# Patient Record
Sex: MALE | Race: OTHER | NOT HISPANIC OR LATINO | Employment: OTHER
[De-identification: names, ages, dates, MRNs, and addresses within clinical notes are randomized per-mention and may not be internally consistent; named-entity substitution may affect disease eponyms.]

---

## 2018-12-05 ENCOUNTER — NEW PATIENT COMPREHENSIVE (OUTPATIENT)
Age: 58
End: 2018-12-05

## 2018-12-05 DIAGNOSIS — H35.373: ICD-10-CM

## 2018-12-05 DIAGNOSIS — H25.9: ICD-10-CM

## 2018-12-05 PROCEDURE — 92134 CPTRZ OPH DX IMG PST SGM RTA: CPT

## 2018-12-05 PROCEDURE — 99204 OFFICE O/P NEW MOD 45 MIN: CPT

## 2018-12-05 PROCEDURE — 92015 DETERMINE REFRACTIVE STATE: CPT

## 2018-12-05 ASSESSMENT — VISUAL ACUITY
OD_CC: J5
OS_SC: J8
OS_SC: 20/70
OS_CC: J6
OD_SC: 20/50+1
OD_SC: J8

## 2018-12-05 ASSESSMENT — KERATOMETRY
OD_AXISANGLE_DEGREES: 21
OD_AXISANGLE2_DEGREES: 111
OS_AXISANGLE2_DEGREES: 95
OD_K1POWER_DIOPTERS: 44
OS_AXISANGLE_DEGREES: 5
OS_K2POWER_DIOPTERS: 43.75
OD_K2POWER_DIOPTERS: 43.75
OS_K1POWER_DIOPTERS: 44

## 2018-12-05 ASSESSMENT — TONOMETRY
OS_IOP_MMHG: 11
OD_IOP_MMHG: 12

## 2020-06-29 ENCOUNTER — NEW PATIENT COMPREHENSIVE (OUTPATIENT)
Dept: URBAN - METROPOLITAN AREA CLINIC 35 | Facility: CLINIC | Age: 60
End: 2020-06-29

## 2020-06-29 DIAGNOSIS — H35.362: ICD-10-CM

## 2020-06-29 DIAGNOSIS — H25.813: ICD-10-CM

## 2020-06-29 PROCEDURE — 92004 COMPRE OPH EXAM NEW PT 1/>: CPT

## 2020-06-29 ASSESSMENT — VISUAL ACUITY
OS_CC: 20/40
OS_SC: 20/60+2
OU_CC: 20/40
OU_SC: 20/40
OU_SC: 20/60
OD_SC: 20/50
OD_CC: 20/40-1
OS_SC: 20/60
OD_CC: 20/50
OS_CC: 20/60
OU_CC: 20/30-2
OD_SC: 20/60

## 2020-06-29 ASSESSMENT — KERATOMETRY
OD_AXISANGLE_DEGREES: 21
OS_K2POWER_DIOPTERS: 43.75
OD_K1POWER_DIOPTERS: 44
OS_AXISANGLE2_DEGREES: 95
OS_AXISANGLE_DEGREES: 5
OD_AXISANGLE2_DEGREES: 111
OS_K1POWER_DIOPTERS: 44
OD_K2POWER_DIOPTERS: 43.75

## 2020-06-29 ASSESSMENT — TONOMETRY
OD_IOP_MMHG: 16
OS_IOP_MMHG: 16

## 2021-01-28 NOTE — PATIENT DISCUSSION
Patient understands condition, prognosis and need for follow up care. Subjective:       Patient ID: Narendra Delarosa is a 52 y.o. male.    Chief Complaint: Rash (genital area / present about 2 months / itch )    Got a massage to months ago, then developed scrotal itching. No rash or lesions. Getting better.      Review of Systems   Constitutional: Negative.    Respiratory: Negative.    Cardiovascular: Negative.    Skin: Positive for rash.       Objective:      Physical Exam   Constitutional: He appears well-developed and well-nourished.   HENT:   Head: Normocephalic and atraumatic.   Eyes: Pupils are equal, round, and reactive to light.   Cardiovascular: Normal rate, regular rhythm and normal heart sounds.    Pulmonary/Chest: Effort normal.   Genitourinary:   Genitourinary Comments: Mild redness scrotum and groin   Musculoskeletal: He exhibits no edema.   Neurological: He is alert.       Assessment:       1. Jock itch    2. High cholesterol        Plan:       Per orders and D/C instructions.  Lotrimin  for rash.

## 2021-11-05 NOTE — PATIENT DISCUSSION
The visual field is suspicious for glaucomatous damage. Visual field doesn't match with appearance of optic nerves. Due to higher IOP and OCT and VF showing probable glaucoma recommend lower IOP OU. Consult with Dr. Winter Weaver for probable SLT OU.

## 2021-12-09 NOTE — PATIENT DISCUSSION
The visual field is suspicious for glaucomatous damage. Visual field doesn't match with appearance of optic nerves. Due to higher IOP and OCT and VF showing probable glaucoma recommend lower IOP OU. Consult with Dr. Karin Andrade for probable SLT OU.

## 2021-12-09 NOTE — PROCEDURE NOTE: CLINICAL
PROCEDURE NOTE: SLT #1 OU. Anesthesia: Topical. Prep: Alphagan 0.15%. Prior to treatment, risks/benefits/alternatives discussed including infection, loss of vision, hemorrhage, cataract, glaucoma, retinal tears or detachment. Lens:  SLT laser lens with goniosol. Power: 1.2/1.2mJ. Total applications: 15/80. Application 366/411 degrees. Patient tolerated procedure well. There were no complications. Post-op instructions given. Post-op IOP = * mmHg. Bishnu Gilliam

## 2022-01-13 NOTE — PATIENT DISCUSSION
The IOP is above the target range. Re-check in 1 month to see if IOP decreases further. If not start Lat Qhs OU.

## 2024-06-12 ASSESSMENT — KERATOMETRY
OD_K1POWER_DIOPTERS: 44
OS_AXISANGLE_DEGREES: 5
OS_K1POWER_DIOPTERS: 44
OD_AXISANGLE2_DEGREES: 111
OS_AXISANGLE2_DEGREES: 95
OD_K2POWER_DIOPTERS: 43.75
OD_AXISANGLE_DEGREES: 21
OS_K2POWER_DIOPTERS: 43.75

## 2024-06-14 ENCOUNTER — CONSULTATION/EVALUATION (OUTPATIENT)
Dept: URBAN - METROPOLITAN AREA CLINIC 39 | Facility: CLINIC | Age: 64
End: 2024-06-14

## 2024-06-14 DIAGNOSIS — H35.373: ICD-10-CM

## 2024-06-14 DIAGNOSIS — L71.9: ICD-10-CM

## 2024-06-14 DIAGNOSIS — H35.362: ICD-10-CM

## 2024-06-14 DIAGNOSIS — H25.813: ICD-10-CM

## 2024-06-14 PROCEDURE — 92134 CPTRZ OPH DX IMG PST SGM RTA: CPT

## 2024-06-14 PROCEDURE — 92286 ANT SGM IMG I&R SPECLR MIC: CPT | Mod: NC

## 2024-06-14 PROCEDURE — 92136 OPHTHALMIC BIOMETRY: CPT

## 2024-06-14 PROCEDURE — 99214 OFFICE O/P EST MOD 30 MIN: CPT

## 2024-06-14 PROCEDURE — 92025-2 CORNEAL TOPOGRAPHY, PT

## 2024-06-14 RX ORDER — KETOROLAC TROMETHAMINE 5 MG/ML: 1 SOLUTION OPHTHALMIC

## 2024-06-14 RX ORDER — MOXIFLOXACIN OPHTHALMIC 5 MG/ML: 1 SOLUTION/ DROPS OPHTHALMIC

## 2024-06-14 RX ORDER — PREDNISOLONE ACETATE 10 MG/ML: 1 SUSPENSION/ DROPS OPHTHALMIC

## 2024-06-14 ASSESSMENT — VISUAL ACUITY
OS_CC: J6
OD_BAT: 20/400
OS_SC: 20/70-2
OD_SC: J12
OD_CC: J3
OD_CC: 20/30+2
OS_CC: 20/60
OS_SC: J12
OD_SC: 20/50+2
OS_BAT: 20/400

## 2024-06-14 ASSESSMENT — TONOMETRY
OS_IOP_MMHG: 16
OD_IOP_MMHG: 16

## 2024-07-10 ASSESSMENT — KERATOMETRY
OD_AXISANGLE_DEGREES: 21
OS_AXISANGLE_DEGREES: 5
OS_K2POWER_DIOPTERS: 43.75
OD_K1POWER_DIOPTERS: 44
OS_AXISANGLE2_DEGREES: 95
OS_K1POWER_DIOPTERS: 44
OD_K2POWER_DIOPTERS: 43.75
OD_AXISANGLE2_DEGREES: 111

## 2024-07-17 ENCOUNTER — POST-OP (OUTPATIENT)
Dept: URBAN - METROPOLITAN AREA CLINIC 39 | Facility: CLINIC | Age: 64
End: 2024-07-17

## 2024-07-17 ENCOUNTER — PRE-OP/H&P (OUTPATIENT)
Dept: URBAN - METROPOLITAN AREA CLINIC 39 | Facility: CLINIC | Age: 64
End: 2024-07-17

## 2024-07-17 ENCOUNTER — SURGERY/PROCEDURE (OUTPATIENT)
Facility: LOCATION | Age: 64
End: 2024-07-17

## 2024-07-17 DIAGNOSIS — H25.813: ICD-10-CM

## 2024-07-17 DIAGNOSIS — H25.89: ICD-10-CM

## 2024-07-17 DIAGNOSIS — Z96.1: ICD-10-CM

## 2024-07-17 DIAGNOSIS — H35.362: ICD-10-CM

## 2024-07-17 DIAGNOSIS — L71.9: ICD-10-CM

## 2024-07-17 PROCEDURE — 99211HP PRE-OP

## 2024-07-17 PROCEDURE — 66982 XCAPSL CTRC RMVL CPLX WO ECP: CPT

## 2024-07-17 PROCEDURE — 99211T TECH SERVICE

## 2024-07-17 ASSESSMENT — KERATOMETRY
OS_AXISANGLE_DEGREES: 5
OS_AXISANGLE2_DEGREES: 95
OS_K1POWER_DIOPTERS: 44
OD_K1POWER_DIOPTERS: 44
OD_AXISANGLE_DEGREES: 21
OD_K2POWER_DIOPTERS: 43.75
OS_K2POWER_DIOPTERS: 43.75
OD_AXISANGLE2_DEGREES: 111

## 2024-07-17 ASSESSMENT — VISUAL ACUITY: OS_SC: 20/80-1

## 2024-07-17 ASSESSMENT — TONOMETRY: OS_IOP_MMHG: 6

## 2024-07-18 ASSESSMENT — KERATOMETRY
OD_K2POWER_DIOPTERS: 43.75
OS_AXISANGLE_DEGREES: 5
OD_AXISANGLE_DEGREES: 21
OD_K1POWER_DIOPTERS: 44
OS_K2POWER_DIOPTERS: 43.75
OD_AXISANGLE2_DEGREES: 111
OS_AXISANGLE2_DEGREES: 95
OS_K1POWER_DIOPTERS: 44

## 2024-07-21 ASSESSMENT — KERATOMETRY
OD_K2POWER_DIOPTERS: 43.75
OS_AXISANGLE2_DEGREES: 95
OS_K1POWER_DIOPTERS: 44
OS_K2POWER_DIOPTERS: 43.75
OD_K1POWER_DIOPTERS: 44
OD_AXISANGLE_DEGREES: 21
OS_AXISANGLE_DEGREES: 5
OD_AXISANGLE2_DEGREES: 111

## 2024-07-25 ENCOUNTER — POST-OP (OUTPATIENT)
Dept: URBAN - METROPOLITAN AREA CLINIC 39 | Facility: CLINIC | Age: 64
End: 2024-07-25

## 2024-07-25 ENCOUNTER — PRE-OP/H&P (OUTPATIENT)
Facility: LOCATION | Age: 64
End: 2024-07-25

## 2024-07-25 DIAGNOSIS — H25.813: ICD-10-CM

## 2024-07-25 DIAGNOSIS — H25.89: ICD-10-CM

## 2024-07-25 DIAGNOSIS — Z96.1: ICD-10-CM

## 2024-07-25 PROCEDURE — 99211HP PRE-OP

## 2024-07-25 PROCEDURE — 99211T TECH SERVICE

## 2024-07-25 ASSESSMENT — KERATOMETRY
OS_K1POWER_DIOPTERS: 44
OS_AXISANGLE_DEGREES: 5
OS_AXISANGLE2_DEGREES: 95
OS_K2POWER_DIOPTERS: 43.75
OD_K2POWER_DIOPTERS: 43.75
OD_AXISANGLE_DEGREES: 21
OD_K1POWER_DIOPTERS: 44
OD_AXISANGLE2_DEGREES: 111

## 2024-07-25 ASSESSMENT — VISUAL ACUITY: OD_SC: 20/70+1

## 2024-07-25 ASSESSMENT — TONOMETRY: OD_IOP_MMHG: 4
